# Patient Record
Sex: FEMALE | Race: BLACK OR AFRICAN AMERICAN | NOT HISPANIC OR LATINO | Employment: FULL TIME | URBAN - METROPOLITAN AREA
[De-identification: names, ages, dates, MRNs, and addresses within clinical notes are randomized per-mention and may not be internally consistent; named-entity substitution may affect disease eponyms.]

---

## 2022-04-09 ENCOUNTER — HISTORICAL (OUTPATIENT)
Dept: ADMINISTRATIVE | Facility: HOSPITAL | Age: 27
End: 2022-04-09

## 2022-04-25 VITALS
WEIGHT: 293 LBS | SYSTOLIC BLOOD PRESSURE: 106 MMHG | BODY MASS INDEX: 48.82 KG/M2 | DIASTOLIC BLOOD PRESSURE: 75 MMHG | OXYGEN SATURATION: 99 % | HEIGHT: 65 IN

## 2022-05-16 ENCOUNTER — OFFICE VISIT (OUTPATIENT)
Dept: URGENT CARE | Facility: CLINIC | Age: 27
End: 2022-05-16
Payer: MEDICAID

## 2022-05-16 VITALS
DIASTOLIC BLOOD PRESSURE: 83 MMHG | OXYGEN SATURATION: 100 % | WEIGHT: 293 LBS | SYSTOLIC BLOOD PRESSURE: 120 MMHG | HEART RATE: 86 BPM | RESPIRATION RATE: 18 BRPM | HEIGHT: 65 IN | TEMPERATURE: 98 F | BODY MASS INDEX: 48.82 KG/M2

## 2022-05-16 DIAGNOSIS — J02.9 SORE THROAT: Primary | ICD-10-CM

## 2022-05-16 DIAGNOSIS — M79.18 MUSCULOSKELETAL PAIN: ICD-10-CM

## 2022-05-16 LAB — STREP A PCR (OHS): NOT DETECTED

## 2022-05-16 PROCEDURE — 3008F BODY MASS INDEX DOCD: CPT | Mod: CPTII,,, | Performed by: FAMILY MEDICINE

## 2022-05-16 PROCEDURE — 1160F PR REVIEW ALL MEDS BY PRESCRIBER/CLIN PHARMACIST DOCUMENTED: ICD-10-PCS | Mod: CPTII,,, | Performed by: FAMILY MEDICINE

## 2022-05-16 PROCEDURE — 1159F MED LIST DOCD IN RCRD: CPT | Mod: CPTII,,, | Performed by: FAMILY MEDICINE

## 2022-05-16 PROCEDURE — 4010F PR ACE/ARB THEARPY RXD/TAKEN: ICD-10-PCS | Mod: CPTII,,, | Performed by: FAMILY MEDICINE

## 2022-05-16 PROCEDURE — 87631 RESP VIRUS 3-5 TARGETS: CPT | Performed by: FAMILY MEDICINE

## 2022-05-16 PROCEDURE — 1160F RVW MEDS BY RX/DR IN RCRD: CPT | Mod: CPTII,,, | Performed by: FAMILY MEDICINE

## 2022-05-16 PROCEDURE — 1159F PR MEDICATION LIST DOCUMENTED IN MEDICAL RECORD: ICD-10-PCS | Mod: CPTII,,, | Performed by: FAMILY MEDICINE

## 2022-05-16 PROCEDURE — 4010F ACE/ARB THERAPY RXD/TAKEN: CPT | Mod: CPTII,,, | Performed by: FAMILY MEDICINE

## 2022-05-16 PROCEDURE — 3079F DIAST BP 80-89 MM HG: CPT | Mod: CPTII,,, | Performed by: FAMILY MEDICINE

## 2022-05-16 PROCEDURE — 3074F SYST BP LT 130 MM HG: CPT | Mod: CPTII,,, | Performed by: FAMILY MEDICINE

## 2022-05-16 PROCEDURE — 3074F PR MOST RECENT SYSTOLIC BLOOD PRESSURE < 130 MM HG: ICD-10-PCS | Mod: CPTII,,, | Performed by: FAMILY MEDICINE

## 2022-05-16 PROCEDURE — 99214 PR OFFICE/OUTPT VISIT, EST, LEVL IV, 30-39 MIN: ICD-10-PCS | Mod: S$PBB,,, | Performed by: FAMILY MEDICINE

## 2022-05-16 PROCEDURE — 3079F PR MOST RECENT DIASTOLIC BLOOD PRESSURE 80-89 MM HG: ICD-10-PCS | Mod: CPTII,,, | Performed by: FAMILY MEDICINE

## 2022-05-16 PROCEDURE — 99214 OFFICE O/P EST MOD 30 MIN: CPT | Mod: PBBFAC | Performed by: FAMILY MEDICINE

## 2022-05-16 PROCEDURE — 3008F PR BODY MASS INDEX (BMI) DOCUMENTED: ICD-10-PCS | Mod: CPTII,,, | Performed by: FAMILY MEDICINE

## 2022-05-16 PROCEDURE — 99214 OFFICE O/P EST MOD 30 MIN: CPT | Mod: S$PBB,,, | Performed by: FAMILY MEDICINE

## 2022-05-16 RX ORDER — ASPIRIN 81 MG/1
81 TABLET ORAL DAILY
COMMUNITY

## 2022-05-16 RX ORDER — HYDROXYCHLOROQUINE SULFATE 200 MG/1
200 TABLET, FILM COATED ORAL EVERY 12 HOURS
COMMUNITY
Start: 2022-04-26

## 2022-05-16 RX ORDER — LISINOPRIL 40 MG/1
TABLET ORAL
COMMUNITY
Start: 2022-05-10

## 2022-05-16 RX ORDER — METHYLPREDNISOLONE 4 MG/1
TABLET ORAL
Qty: 1 EACH | Refills: 0 | Status: SHIPPED | OUTPATIENT
Start: 2022-05-16 | End: 2022-11-13 | Stop reason: ALTCHOICE

## 2022-05-16 RX ORDER — ETODOLAC 300 MG/1
CAPSULE ORAL
COMMUNITY
Start: 2022-05-02

## 2022-05-16 RX ORDER — HYDROCHLOROTHIAZIDE 25 MG/1
25 TABLET ORAL DAILY
COMMUNITY
Start: 2022-01-24

## 2022-05-16 NOTE — PROGRESS NOTES
"Subjective:       Patient ID: Raimundo Humphries is a 26 y.o. female.    Vitals:  height is 5' 4.96" (1.65 m) and weight is 206 kg (454 lb 2.4 oz) (abnormal). Her temperature is 98.1 °F (36.7 °C). Her blood pressure is 120/83 and her pulse is 86. Her respiration is 18 and oxygen saturation is 100%.     Chief Complaint: Sore Throat (Runny nose, sore throat since yesterday. Possible Lupus flare. Agreed to Strep testing, declined all nasal swabs. )    26 year old female presents to urgent care with 1 day of clear rhinorrhea, scratchy throat.  No difficulty swallowing.  Denies neck pain or stiffness.  No fever.  Also requesting cortisone for a lupus flare.  Pain in bilateral wrists.  States she has an outside PCP and rheumatologist.  Chart review.  no information available in epic.    ROS    Objective:      Physical Exam   Constitutional: She is oriented to person, place, and time. She appears well-developed. She is cooperative.  Non-toxic appearance. She does not appear ill. No distress.   HENT:   Head: Normocephalic and atraumatic.   Ears:   Right Ear: Hearing normal.   Left Ear: Hearing normal.   Nose: Nose normal. No mucosal edema, rhinorrhea or nasal deformity. No epistaxis. Right sinus exhibits no maxillary sinus tenderness and no frontal sinus tenderness. Left sinus exhibits no maxillary sinus tenderness and no frontal sinus tenderness.   Mouth/Throat: Uvula is midline, oropharynx is clear and moist and mucous membranes are normal. No trismus in the jaw. Normal dentition. No uvula swelling. No oropharyngeal exudate, posterior oropharyngeal edema or posterior oropharyngeal erythema. Oropharynx is clear.   Eyes: Conjunctivae and lids are normal. No scleral icterus.   Neck: Trachea normal and phonation normal. Neck supple. No edema present. No erythema present. No neck rigidity present.   Cardiovascular: Normal rate, regular rhythm, normal heart sounds and normal pulses.   Pulmonary/Chest: Effort normal and breath sounds " normal. No respiratory distress. She has no decreased breath sounds. She has no rhonchi.   Abdominal: Normal appearance.   Musculoskeletal: Normal range of motion.         General: Tenderness (Right wrist with no appreciable effusion, some deformity, mild tenderness dorsally.  No heat or erythema.) present. No deformity. Normal range of motion.   Neurological: She is alert and oriented to person, place, and time. She exhibits normal muscle tone. Coordination normal.   Skin: Skin is warm, dry, intact, not diaphoretic and not pale.   Psychiatric: Her speech is normal and behavior is normal. Judgment and thought content normal.   Nursing note and vitals reviewed.        Assessment:       1. Sore throat    2. Musculoskeletal pain          Plan:       Patient declined nasal swabs.  Will notify if strep PCR is positive.  Encouraged warm salt water gargles, increase fluids.  Agreed to give a Medrol Dosepak.  Encouraged follow-up with PCP and rheumatologist.  Please follow instructions on patient education material.  Return to urgent care in 2 to 3 days if symptoms are not improving, immediately if you develop any new or worsening symptoms.  Sore throat  -     Strep Group A by PCR; Future; Expected date: 05/16/2022    Musculoskeletal pain    Other orders  -     methylPREDNISolone (MEDROL DOSEPACK) 4 mg tablet; use as directed  Dispense: 1 each; Refill: 0

## 2022-05-16 NOTE — LETTER
May 16, 2022      [facility]  [address] [address]  Phone: [phone]       Patient: Raimundo Humphries   YOB: 1995  Date of Visit: 05/16/2022    To Whom It May Concern:    Joaquina Humphries  was at [facility] on 05/16/2022. The patient may return to work/school when results are received without restrictions. If you have any questions or concerns, or if I can be of further assistance, please do not hesitate to contact me.    Sincerely,    JHOAN Evangelista MD

## 2022-11-13 ENCOUNTER — OFFICE VISIT (OUTPATIENT)
Dept: URGENT CARE | Facility: CLINIC | Age: 27
End: 2022-11-13
Payer: MEDICAID

## 2022-11-13 VITALS
WEIGHT: 293 LBS | TEMPERATURE: 98 F | OXYGEN SATURATION: 100 % | RESPIRATION RATE: 18 BRPM | SYSTOLIC BLOOD PRESSURE: 141 MMHG | HEART RATE: 85 BPM | HEIGHT: 65 IN | DIASTOLIC BLOOD PRESSURE: 108 MMHG | BODY MASS INDEX: 48.82 KG/M2

## 2022-11-13 DIAGNOSIS — M19.90 ARTHRITIS: Primary | ICD-10-CM

## 2022-11-13 PROBLEM — M79.10 MYALGIA: Status: ACTIVE | Noted: 2021-03-31

## 2022-11-13 PROBLEM — L93.0 DISCOID LUPUS ERYTHEMATOSUS: Status: ACTIVE | Noted: 2022-11-13

## 2022-11-13 PROBLEM — E66.01 MORBID OBESITY: Status: ACTIVE | Noted: 2022-11-13

## 2022-11-13 PROBLEM — L93.0 LUPUS ERYTHEMATOSUS: Status: ACTIVE | Noted: 2022-11-13

## 2022-11-13 PROBLEM — I10 PRIMARY HYPERTENSION: Status: ACTIVE | Noted: 2022-11-13

## 2022-11-13 PROCEDURE — 99213 OFFICE O/P EST LOW 20 MIN: CPT | Mod: S$PBB,,, | Performed by: NURSE PRACTITIONER

## 2022-11-13 PROCEDURE — 99214 OFFICE O/P EST MOD 30 MIN: CPT | Mod: PBBFAC | Performed by: NURSE PRACTITIONER

## 2022-11-13 PROCEDURE — 99213 PR OFFICE/OUTPT VISIT, EST, LEVL III, 20-29 MIN: ICD-10-PCS | Mod: S$PBB,,, | Performed by: NURSE PRACTITIONER

## 2022-11-13 PROCEDURE — 63600175 PHARM REV CODE 636 W HCPCS: Performed by: NURSE PRACTITIONER

## 2022-11-13 RX ORDER — PREDNISONE 10 MG/1
TABLET ORAL
Qty: 10 TABLET | Refills: 0 | Status: SHIPPED | OUTPATIENT
Start: 2022-11-13

## 2022-11-13 RX ORDER — KETOROLAC TROMETHAMINE 30 MG/ML
60 INJECTION, SOLUTION INTRAMUSCULAR; INTRAVENOUS
Status: COMPLETED | OUTPATIENT
Start: 2022-11-13 | End: 2022-11-13

## 2022-11-13 RX ORDER — CYCLOBENZAPRINE HCL 5 MG
5 TABLET ORAL EVERY 8 HOURS PRN
COMMUNITY
Start: 2022-10-06

## 2022-11-13 RX ADMIN — KETOROLAC TROMETHAMINE 60 MG: 60 INJECTION, SOLUTION INTRAMUSCULAR at 03:11

## 2022-11-13 NOTE — PATIENT INSTRUCTIONS
Please read the attached information about NSAIDs.    Please use the prescriptions that were sent for you.     You got a Toradol shot in clinic today. For the next 8 hours do not take:  Ibuprofen  Naproxen  Advil  Aleve  Motrin  Ketorolac   Diclofenac  Meloxicam  Indomethacin  Etodolac

## 2022-11-13 NOTE — PROGRESS NOTES
"Subjective:       Patient ID: Raimundo Humphries is a 27 y.o. female.    Vitals:  height is 5' 4.96" (1.65 m) and weight is 213.6 kg (470 lb 12.8 oz) (abnormal). Her oral temperature is 97.9 °F (36.6 °C). Her blood pressure is 141/108 (abnormal) and her pulse is 85. Her respiration is 18 and oxygen saturation is 100%.     Chief Complaint: Knee Pain (pT REPORTS THAT SHE HAS LUPUS AND ARTHRITIS AND WEATHER IS MAKING HER KNEE HURT)    HPI right knee pain flares a few times per year. States used to take diclofenac, "doesn't work." And has another Rx anti-inflammatory but did not take today. States her rheumatologist usually gives 3 days of prednisone. Pt has never had Toradol before.  ROS    Objective:      Physical Exam   Constitutional: She is oriented to person, place, and time. She does not appear ill. obesity  Pulmonary/Chest: Effort normal.   Abdominal: Normal appearance.   Musculoskeletal:         General: Tenderness present.   Neurological: She is alert and oriented to person, place, and time. Gait normal.   Skin: Skin is warm and dry.   Psychiatric: Her behavior is normal. Mood normal.   Vitals reviewed.      Assessment:       1. Arthritis            Plan:         Arthritis    Other orders  -     ketorolac injection 60 mg  -     predniSONE (DELTASONE) 10 MG tablet; 40mg (4 tabs) day 1. 30mg (3 tabs) day 2; 20mg (2 tabs) day 3. 10mg (1 tab) day 4. Take with food and water no later than 5pm.  Dispense: 10 tablet; Refill: 0       Please read the attached information about NSAIDs.    Please use the prescriptions that were sent for you.     You got a Toradol shot in clinic today. For the next 8 hours do not take:  Ibuprofen  Naproxen  Advil  Aleve  Motrin  Ketorolac   Diclofenac  Meloxicam  Indomethacin  Etodolac              "

## 2024-11-19 ENCOUNTER — PATIENT MESSAGE (OUTPATIENT)
Dept: GASTROENTEROLOGY | Facility: CLINIC | Age: 29
End: 2024-11-19
Payer: MEDICAID

## 2025-07-22 DIAGNOSIS — L93.0 DISCOID LUPUS ERYTHEMATOSUS: Primary | ICD-10-CM
